# Patient Record
Sex: MALE | Race: WHITE | NOT HISPANIC OR LATINO | Employment: OTHER | URBAN - METROPOLITAN AREA
[De-identification: names, ages, dates, MRNs, and addresses within clinical notes are randomized per-mention and may not be internally consistent; named-entity substitution may affect disease eponyms.]

---

## 2021-05-26 ENCOUNTER — RECORDS - HEALTHEAST (OUTPATIENT)
Dept: ADMINISTRATIVE | Facility: CLINIC | Age: 51
End: 2021-05-26

## 2021-05-27 ENCOUNTER — RECORDS - HEALTHEAST (OUTPATIENT)
Dept: ADMINISTRATIVE | Facility: CLINIC | Age: 51
End: 2021-05-27

## 2021-05-30 ENCOUNTER — RECORDS - HEALTHEAST (OUTPATIENT)
Dept: ADMINISTRATIVE | Facility: CLINIC | Age: 51
End: 2021-05-30

## 2021-05-31 ENCOUNTER — RECORDS - HEALTHEAST (OUTPATIENT)
Dept: ADMINISTRATIVE | Facility: CLINIC | Age: 51
End: 2021-05-31

## 2021-06-01 ENCOUNTER — RECORDS - HEALTHEAST (OUTPATIENT)
Dept: ADMINISTRATIVE | Facility: CLINIC | Age: 51
End: 2021-06-01

## 2021-09-22 ENCOUNTER — MEDICAL CORRESPONDENCE (OUTPATIENT)
Dept: HEALTH INFORMATION MANAGEMENT | Facility: CLINIC | Age: 51
End: 2021-09-22
Payer: MEDICARE

## 2021-10-18 ENCOUNTER — TRANSCRIBE ORDERS (OUTPATIENT)
Dept: OTHER | Age: 51
End: 2021-10-18

## 2021-11-01 ENCOUNTER — TELEPHONE (OUTPATIENT)
Dept: EDUCATION SERVICES | Facility: CLINIC | Age: 51
End: 2021-11-01
Payer: MEDICARE

## 2021-11-01 NOTE — TELEPHONE ENCOUNTER
Please fax Diabetes Ed order to [facility] @ [phone], attn: Dr. Monteiro.   Dr Monteiro will need to fill our order form in order for patient to be seen w/[facility].

## 2021-11-01 NOTE — TELEPHONE ENCOUNTER
Referral received  10/27/2021 3:17pm inside DM Consult    [facility] - [phone]  Referring: Dr Monteiro  DX: DM type 2    Missing record DM Order and records.

## 2021-11-15 NOTE — TELEPHONE ENCOUNTER
Yes, if you can send a blank order out and note that order is needed for dm ed care for Humphrey, that would be wonderful! Thanks!

## 2021-11-15 NOTE — TELEPHONE ENCOUNTER
11/15 - pt calling back for update on this. Has form been sent? As we havent received anything from carlos on our end.

## 2021-11-17 NOTE — TELEPHONE ENCOUNTER
11/17 - called referring office x 2 - spoke with referring coordinator, states that provider declined to fill out and sign form as patient should be seeing one of their dm ed at their locations.

## 2021-11-17 NOTE — TELEPHONE ENCOUNTER
11/17 - called pt x 1 - pt notified that pcp declined to fill out and sign paperwork. And that pcp wants pt to see [facility]. He will call pcp office and go from there.

## 2024-05-31 ENCOUNTER — APPOINTMENT (OUTPATIENT)
Dept: CT IMAGING | Facility: HOSPITAL | Age: 54
End: 2024-05-31
Attending: STUDENT IN AN ORGANIZED HEALTH CARE EDUCATION/TRAINING PROGRAM
Payer: COMMERCIAL

## 2024-05-31 ENCOUNTER — HOSPITAL ENCOUNTER (EMERGENCY)
Facility: HOSPITAL | Age: 54
Discharge: HOME | End: 2024-05-31
Attending: EMERGENCY MEDICINE | Admitting: EMERGENCY MEDICINE
Payer: COMMERCIAL

## 2024-05-31 VITALS
SYSTOLIC BLOOD PRESSURE: 140 MMHG | OXYGEN SATURATION: 97 % | HEIGHT: 63 IN | TEMPERATURE: 97.5 F | RESPIRATION RATE: 16 BRPM | DIASTOLIC BLOOD PRESSURE: 85 MMHG | WEIGHT: 220 LBS | BODY MASS INDEX: 38.98 KG/M2 | HEART RATE: 82 BPM

## 2024-05-31 DIAGNOSIS — N20.1 URETEROLITHIASIS: ICD-10-CM

## 2024-05-31 LAB
ALBUMIN SERPL BCG-MCNC: 4.7 G/DL (ref 3.5–5.2)
ALBUMIN UR-MCNC: 30 MG/DL
ALP SERPL-CCNC: 51 U/L (ref 40–150)
ALT SERPL W P-5'-P-CCNC: 37 U/L (ref 0–70)
ANION GAP SERPL CALCULATED.3IONS-SCNC: 14 MMOL/L (ref 7–15)
APPEARANCE UR: CLEAR
AST SERPL W P-5'-P-CCNC: 30 U/L (ref 0–45)
BASOPHILS # BLD AUTO: 0.1 10E3/UL (ref 0–0.2)
BASOPHILS NFR BLD AUTO: 1 %
BILIRUB DIRECT SERPL-MCNC: <0.2 MG/DL (ref 0–0.3)
BILIRUB SERPL-MCNC: 0.6 MG/DL
BILIRUB UR QL STRIP: NEGATIVE
BUN SERPL-MCNC: 18.9 MG/DL (ref 6–20)
CALCIUM SERPL-MCNC: 9.9 MG/DL (ref 8.6–10)
CHLORIDE SERPL-SCNC: 99 MMOL/L (ref 98–107)
COLOR UR AUTO: ABNORMAL
CREAT SERPL-MCNC: 1.32 MG/DL (ref 0.67–1.17)
DEPRECATED HCO3 PLAS-SCNC: 22 MMOL/L (ref 22–29)
EGFRCR SERPLBLD CKD-EPI 2021: 65 ML/MIN/1.73M2
EOSINOPHIL # BLD AUTO: 0 10E3/UL (ref 0–0.7)
EOSINOPHIL NFR BLD AUTO: 0 %
ERYTHROCYTE [DISTWIDTH] IN BLOOD BY AUTOMATED COUNT: 12.8 % (ref 10–15)
GLUCOSE SERPL-MCNC: 227 MG/DL (ref 70–99)
GLUCOSE UR STRIP-MCNC: 100 MG/DL
HCT VFR BLD AUTO: 42 % (ref 40–53)
HGB BLD-MCNC: 14.2 G/DL (ref 13.3–17.7)
HGB UR QL STRIP: ABNORMAL
HOLD SPECIMEN: NORMAL
IMM GRANULOCYTES # BLD: 0.1 10E3/UL
IMM GRANULOCYTES NFR BLD: 1 %
KETONES UR STRIP-MCNC: NEGATIVE MG/DL
LEUKOCYTE ESTERASE UR QL STRIP: NEGATIVE
LIPASE SERPL-CCNC: 46 U/L (ref 13–60)
LYMPHOCYTES # BLD AUTO: 1.9 10E3/UL (ref 0.8–5.3)
LYMPHOCYTES NFR BLD AUTO: 11 %
MCH RBC QN AUTO: 31.1 PG (ref 26.5–33)
MCHC RBC AUTO-ENTMCNC: 33.8 G/DL (ref 31.5–36.5)
MCV RBC AUTO: 92 FL (ref 78–100)
MONOCYTES # BLD AUTO: 1.2 10E3/UL (ref 0–1.3)
MONOCYTES NFR BLD AUTO: 7 %
MUCOUS THREADS #/AREA URNS LPF: PRESENT /LPF
NEUTROPHILS # BLD AUTO: 13.4 10E3/UL (ref 1.6–8.3)
NEUTROPHILS NFR BLD AUTO: 80 %
NITRATE UR QL: NEGATIVE
NRBC # BLD AUTO: 0 10E3/UL
NRBC BLD AUTO-RTO: 0 /100
PH UR STRIP: 6.5 [PH] (ref 5–7)
PLATELET # BLD AUTO: 336 10E3/UL (ref 150–450)
POTASSIUM SERPL-SCNC: 4.7 MMOL/L (ref 3.4–5.3)
PROT SERPL-MCNC: 7.6 G/DL (ref 6.4–8.3)
RBC # BLD AUTO: 4.57 10E6/UL (ref 4.4–5.9)
RBC URINE: >182 /HPF
SODIUM SERPL-SCNC: 135 MMOL/L (ref 135–145)
SP GR UR STRIP: 1.02 (ref 1–1.03)
SQUAMOUS EPITHELIAL: <1 /HPF
UROBILINOGEN UR STRIP-MCNC: <2 MG/DL
WBC # BLD AUTO: 16.6 10E3/UL (ref 4–11)
WBC URINE: 2 /HPF

## 2024-05-31 PROCEDURE — 99285 EMERGENCY DEPT VISIT HI MDM: CPT | Mod: 25

## 2024-05-31 PROCEDURE — 85025 COMPLETE CBC W/AUTO DIFF WBC: CPT | Performed by: EMERGENCY MEDICINE

## 2024-05-31 PROCEDURE — 250N000011 HC RX IP 250 OP 636: Performed by: EMERGENCY MEDICINE

## 2024-05-31 PROCEDURE — 74177 CT ABD & PELVIS W/CONTRAST: CPT

## 2024-05-31 PROCEDURE — 250N000011 HC RX IP 250 OP 636: Performed by: STUDENT IN AN ORGANIZED HEALTH CARE EDUCATION/TRAINING PROGRAM

## 2024-05-31 PROCEDURE — 83690 ASSAY OF LIPASE: CPT | Performed by: EMERGENCY MEDICINE

## 2024-05-31 PROCEDURE — 36415 COLL VENOUS BLD VENIPUNCTURE: CPT | Performed by: STUDENT IN AN ORGANIZED HEALTH CARE EDUCATION/TRAINING PROGRAM

## 2024-05-31 PROCEDURE — 80053 COMPREHEN METABOLIC PANEL: CPT | Performed by: EMERGENCY MEDICINE

## 2024-05-31 PROCEDURE — 82248 BILIRUBIN DIRECT: CPT | Performed by: EMERGENCY MEDICINE

## 2024-05-31 PROCEDURE — 81001 URINALYSIS AUTO W/SCOPE: CPT | Performed by: EMERGENCY MEDICINE

## 2024-05-31 PROCEDURE — 96374 THER/PROPH/DIAG INJ IV PUSH: CPT

## 2024-05-31 RX ORDER — ONDANSETRON 4 MG/1
4 TABLET, ORALLY DISINTEGRATING ORAL EVERY 8 HOURS PRN
Qty: 10 TABLET | Refills: 0 | Status: SHIPPED | OUTPATIENT
Start: 2024-05-31

## 2024-05-31 RX ORDER — OXYCODONE HYDROCHLORIDE 5 MG/1
5 TABLET ORAL EVERY 4 HOURS PRN
Qty: 8 TABLET | Refills: 0 | Status: SHIPPED | OUTPATIENT
Start: 2024-05-31

## 2024-05-31 RX ORDER — BISACODYL 5 MG/1
5 TABLET, DELAYED RELEASE ORAL DAILY PRN
Qty: 20 TABLET | Refills: 0 | Status: SHIPPED | OUTPATIENT
Start: 2024-05-31

## 2024-05-31 RX ORDER — IOPAMIDOL 755 MG/ML
90 INJECTION, SOLUTION INTRAVASCULAR ONCE
Status: COMPLETED | OUTPATIENT
Start: 2024-05-31 | End: 2024-05-31

## 2024-05-31 RX ORDER — KETOROLAC TROMETHAMINE 15 MG/ML
15 INJECTION, SOLUTION INTRAMUSCULAR; INTRAVENOUS ONCE
Status: COMPLETED | OUTPATIENT
Start: 2024-05-31 | End: 2024-05-31

## 2024-05-31 RX ADMIN — KETOROLAC TROMETHAMINE 15 MG: 15 INJECTION, SOLUTION INTRAMUSCULAR; INTRAVENOUS at 18:36

## 2024-05-31 RX ADMIN — IOPAMIDOL 90 ML: 755 INJECTION, SOLUTION INTRAVENOUS at 19:03

## 2024-05-31 ASSESSMENT — COLUMBIA-SUICIDE SEVERITY RATING SCALE - C-SSRS
1. IN THE PAST MONTH, HAVE YOU WISHED YOU WERE DEAD OR WISHED YOU COULD GO TO SLEEP AND NOT WAKE UP?: NO
2. HAVE YOU ACTUALLY HAD ANY THOUGHTS OF KILLING YOURSELF IN THE PAST MONTH?: NO
6. HAVE YOU EVER DONE ANYTHING, STARTED TO DO ANYTHING, OR PREPARED TO DO ANYTHING TO END YOUR LIFE?: NO

## 2024-05-31 ASSESSMENT — ACTIVITIES OF DAILY LIVING (ADL)
ADLS_ACUITY_SCORE: 35
ADLS_ACUITY_SCORE: 33
ADLS_ACUITY_SCORE: 35

## 2024-05-31 NOTE — ED PROVIDER NOTES
Emergency Department Encounter   NAME: Sameer Fountain ; AGE: 53 year old male ; YOB: 1970 ; MRN: [id number] ; PCP: No primary care provider on file.   ED PROVIDER: Nancy Palacio PA-C    Evaluation Date & Time:   No admission date for patient encounter.    CHIEF COMPLAINT:  Flank Pain        Impression and Plan   FINAL IMPRESSION:    ICD-10-CM    1. Ureterolithiasis  N20.1 Adult Urology Referral          MDM:  Sameer is a 53 year old male with PMH of GERD, HTN, and T2DM presenting to the ED for evaluation of right-sided abdominal pain that radiates to the right side flank that started upon waking up this morning.  He took Tylenol and ibuprofen and states this did not really help reduce his pain. He does endorse mild nausea when the pain is at its worst with a few episodes of non-bloody vomiting. No fevers or chills. Does endorse some constipation, no diarrhea.  No blood in the stool or dark tarry stools.    Vitals reviewed and unremarkable aside from a BP of 158/83. On exam he is resting comfortably, not toxic-appearing or diaphoretic. Differential diagnosis includes but not limited to UTI, constipation, gallbladder pathology, pancreatitis, appendicitis, kidney stone, SBO. On exam he has reproducible right lower quadrant as well as right upper quadrant tenderness.  No right-sided CVA tenderness although he is endorsing right-sided flank pain. The rest of the abdomen is nontender.  The abdomen is overall soft and nondistended without any overlying skin changes.    CT abdomen pelvis finds an obstructing 3 mm stone in the right distal ureter with mild hydroureteronephrosis. There are additional bilateral nonobstructing renal calculi. No evidence of pyelonephritis and he does not have a fever or CVA tenderness on the right side. CT also finds a large volume of stool throughout the colon and is suggestive of constipation, no evidence of bowel obstruction or other acute finding in the abdomen or pelvis.  His urine shows evidence of blood which is also very consistent with a kidney stone.  No signs of UTI to indicate an infected stone.  He has a very mildly elevated creatinine of 1.32, I have no previous lab work to compare to.  GFR is within normal range and I have overall low suspicion for MAEGAN at this point.  CBC finds a leukocytosis of 16.6.  All other lab work unremarkable.    Given the stone is only 3 mm I suspect he will likely pass this in the next few days. He was given a strainer for home as well as some oxycodone for severe pain and Zofran for nausea.  A urology referral was placed.  I have also given him a prescription for Dulcolax to take daily given he has evidence of constipation on CT and I am prescribing him oxycodone. I recommended he take this daily until he starts having regular bowel movements, then he can just take it as needed.  He was educated on concerning symptoms that would warrant return to the ED and will follow-up with his primary care provider and urology.  Patient is understanding and agreeable to this plan.      History:  Supplemental history from: Documented in chart  External Record(s) reviewed: Documented in chart    Work Up:  Chart documentation includes differential considered and any EKGs or imaging independently interpreted by provider, where specified.  In additional to work up documented, I considered the following work up: Documented in chart, if applicable.    External consultation:  Discussion of management with another provider: Dr. Hoffmann    Complicating factors:  Care impacted by chronic illness: N/A  Care affected by social determinants of health: N/A    Disposition considerations: Discharge. I prescribed additional prescription strength medication(s) as charted. See documentation for any additional details.      ED COURSE:  6:00 PM I met and introduced myself to the patient. I gathered initial history and performed my physical exam. We discussed plan for initial  workup.   6:15 PM PM I have staffed the patient with Dr. Hoffmann, ED MD, who has evaluated the patient and agrees with all aspects of today's care.   8:03 PM I rechecked the patient and discussed results, discharge, follow up, and reasons to return to the ED.     At the conclusion of the encounter I discussed the results of all the tests and the disposition. The questions were answered. The patient or family acknowledged understanding and was agreeable with the care plan.        MEDICATIONS GIVEN IN THE EMERGENCY DEPARTMENT:  Medications   ketorolac (TORADOL) injection 15 mg (15 mg Intravenous $Given 5/31/24 1836)   iopamidol (ISOVUE-370) solution 90 mL (90 mLs Intravenous $Given 5/31/24 1905)         NEW PRESCRIPTIONS STARTED AT TODAY'S ED VISIT:  Discharge Medication List as of 5/31/2024  8:03 PM        START taking these medications    Details   bisacodyl (DULCOLAX) 5 MG EC tablet Take 1 tablet (5 mg) by mouth daily as needed for constipation, Disp-20 tablet, R-0, Local Print      ondansetron (ZOFRAN ODT) 4 MG ODT tab Take 1 tablet (4 mg) by mouth every 8 hours as needed for nausea, Disp-10 tablet, R-0, Local Print      oxyCODONE (ROXICODONE) 5 MG tablet Take 1 tablet (5 mg) by mouth every 4 hours as needed for severe pain If pain is not improved with acetaminophen and ibuprofen., Disp-8 tablet, R-0, Local Print               HPI   Patient information was obtained from: patient  Use of Intrepreter: N/A     Sameer is a 53 year old male with PMH of GERD, HTN, and T2DM presenting to the ED for evaluation of right-sided abdominal pain that radiates to the right side flank that started upon waking up this morning.  He took Tylenol and ibuprofen and states this did not really help reduce his pain. He does endorse mild nausea when the pain is at its worst with a few episodes of non-bloody vomiting. No fevers or chills. Does endorse some constipation, no diarrhea.  No blood in the stool or dark tarry  "stools.      Medical History     No past medical history on file.    No past surgical history on file.    No family history on file.         bisacodyl (DULCOLAX) 5 MG EC tablet  ondansetron (ZOFRAN ODT) 4 MG ODT tab  oxyCODONE (ROXICODONE) 5 MG tablet          Physical Exam     First Vitals:  Patient Vitals for the past 24 hrs:   BP Temp Temp src Pulse Resp SpO2 Height Weight   05/31/24 2007 (!) 140/85 -- -- 82 -- 97 % -- --   05/31/24 1845 129/72 -- -- 93 -- 96 % -- --   05/31/24 1806 (!) 151/90 -- -- 81 -- 97 % -- --   05/31/24 1720 (!) 158/83 97.5  F (36.4  C) Oral 81 16 98 % 1.6 m (5' 3\") 99.8 kg (220 lb)         PHYSICAL EXAM    General Appearance:  Alert, cooperative, no distress, appears stated age. Sitting comfortably, nontoxic-appearing.  HENT: Normocephalic without obvious deformity, atraumatic. Mucous membranes moist   Eyes: Conjunctiva clear, Lids normal. No discharge.   Respiratory: No distress. Lungs clear to ausculation bilaterally. No wheezes, rhonchi or stridor  Cardiovascular: Regular rate and rhythm, no murmur. Normal cap refill. No peripheral edema  GI: Abdomen soft, nondistended.  He has reproducible tenderness in the right upper and right lower quadrant.  : No CVA tenderness  Musculoskeletal: Moving all extremities. No gross deformities  Integument: Warm, dry, no rashes or lesions  Neurologic: Alert and orientated x3. No focal deficits.  Psych: Normal mood and affect        Results     LAB:  All pertinent labs reviewed and interpreted  Labs Ordered and Resulted from Time of ED Arrival to Time of ED Departure   COMPREHENSIVE METABOLIC PANEL - Abnormal       Result Value    Sodium 135      Potassium 4.7      Carbon Dioxide (CO2) 22      Anion Gap 14      Urea Nitrogen 18.9      Creatinine 1.32 (*)     GFR Estimate 65      Calcium 9.9      Chloride 99      Glucose 227 (*)     Alkaline Phosphatase 51      AST 30      ALT 37      Protein Total 7.6      Albumin 4.7      Bilirubin Total 0.6   "   ROUTINE UA WITH MICROSCOPIC REFLEX TO CULTURE - Abnormal    Color Urine Light Yellow      Appearance Urine Clear      Glucose Urine 100 (*)     Bilirubin Urine Negative      Ketones Urine Negative      Specific Gravity Urine 1.023      Blood Urine 1.0 mg/dL (*)     pH Urine 6.5      Protein Albumin Urine 30 (*)     Urobilinogen Urine <2.0      Nitrite Urine Negative      Leukocyte Esterase Urine Negative      Mucus Urine Present (*)     RBC Urine >182 (*)     WBC Urine 2      Squamous Epithelials Urine <1     CBC WITH PLATELETS AND DIFFERENTIAL - Abnormal    WBC Count 16.6 (*)     RBC Count 4.57      Hemoglobin 14.2      Hematocrit 42.0      MCV 92      MCH 31.1      MCHC 33.8      RDW 12.8      Platelet Count 336      % Neutrophils 80      % Lymphocytes 11      % Monocytes 7      % Eosinophils 0      % Basophils 1      % Immature Granulocytes 1      NRBCs per 100 WBC 0      Absolute Neutrophils 13.4 (*)     Absolute Lymphocytes 1.9      Absolute Monocytes 1.2      Absolute Eosinophils 0.0      Absolute Basophils 0.1      Absolute Immature Granulocytes 0.1      Absolute NRBCs 0.0     LIPASE - Normal    Lipase 46     BILIRUBIN DIRECT - Normal    Bilirubin Direct <0.20         RADIOLOGY:  CT Abdomen Pelvis w Contrast   Final Result   IMPRESSION:    1.  Obstructing 3 mm stone in the distal right ureter with mild proximal hydroureteronephrosis and delayed nephrogram.   2.  Additional bilateral nonobstructing renal calculi.   3.  Diffuse hepatic steatosis.   4.  Large volume formed stool throughout the colon, suggestive of constipation.            ECG:  N/A      PROCEDURES:  N/A      Nancy Palacio PA-C   Emergency Medicine   [facility] EMERGENCY DEPARTMENT       Nancy Palacio PA-C  05/31/24 204

## 2024-05-31 NOTE — ED PROVIDER NOTES
I am seeing this patient along with Nancy Palacio PA-C. I had a face to face encounter with this patient seen by the Advanced Practice Provider (JUDY).  I have seen, examined, and discussed the patient with the JUDY and agree with their assessment and plan of management. I personally saw the patient and performed a substantive portion of the visit including all aspects of the medical decision making.    HPI:  Sameer Fountain is a 53 y.o. male with pertinent history of hypertension and diabetes mellitus type II who presents with right-sided abdominal pain radiating to his right flank that he noticed upon waking up this morning and is associated with mild nausea. Tylenol and ibuprofen provided no relief. No vomiting.  ROS:    Physical Exam: Uncomfortable appearing      LABS  Pertinent lab results reviewed in chart.  Labs Ordered and Resulted from Time of ED Arrival to Time of ED Departure   COMPREHENSIVE METABOLIC PANEL - Abnormal       Result Value    Sodium 135      Potassium 4.7      Carbon Dioxide (CO2) 22      Anion Gap 14      Urea Nitrogen 18.9      Creatinine 1.32 (*)     GFR Estimate 65      Calcium 9.9      Chloride 99      Glucose 227 (*)     Alkaline Phosphatase 51      AST 30      ALT 37      Protein Total 7.6      Albumin 4.7      Bilirubin Total 0.6     ROUTINE UA WITH MICROSCOPIC REFLEX TO CULTURE - Abnormal    Color Urine Light Yellow      Appearance Urine Clear      Glucose Urine 100 (*)     Bilirubin Urine Negative      Ketones Urine Negative      Specific Gravity Urine 1.023      Blood Urine 1.0 mg/dL (*)     pH Urine 6.5      Protein Albumin Urine 30 (*)     Urobilinogen Urine <2.0      Nitrite Urine Negative      Leukocyte Esterase Urine Negative      Mucus Urine Present (*)     RBC Urine >182 (*)     WBC Urine 2      Squamous Epithelials Urine <1     CBC WITH PLATELETS AND DIFFERENTIAL - Abnormal    WBC Count 16.6 (*)     RBC Count 4.57      Hemoglobin 14.2      Hematocrit 42.0      MCV 92       MCH 31.1      MCHC 33.8      RDW 12.8      Platelet Count 336      % Neutrophils 80      % Lymphocytes 11      % Monocytes 7      % Eosinophils 0      % Basophils 1      % Immature Granulocytes 1      NRBCs per 100 WBC 0      Absolute Neutrophils 13.4 (*)     Absolute Lymphocytes 1.9      Absolute Monocytes 1.2      Absolute Eosinophils 0.0      Absolute Basophils 0.1      Absolute Immature Granulocytes 0.1      Absolute NRBCs 0.0     LIPASE - Normal    Lipase 46     BILIRUBIN DIRECT - Normal    Bilirubin Direct <0.20         EKG  N/A    RADIOLOGY  CT Abdomen Pelvis w Contrast   Final Result   IMPRESSION:    1.  Obstructing 3 mm stone in the distal right ureter with mild proximal hydroureteronephrosis and delayed nephrogram.   2.  Additional bilateral nonobstructing renal calculi.   3.  Diffuse hepatic steatosis.   4.  Large volume formed stool throughout the colon, suggestive of constipation.          PROCEDURES       ED COURSE & MEDICAL DECISION MAKING    6:18 PM The patient was staffed with me by Nancy Palacio PA-C.    Pertinent Labs and Imagaing reviewed (see chart for details)    53 year old male here for evaluation of flank pain and nausea that started suddenly.  Clinically, he is uncomfortable appearing and having difficulty sitting down.  We did consider musculoskeletal etiology versus something like AAA, ureterolithiasis, pyelonephritis.  Labs show hematuria with no signs of infection on his UA, no signs of renal dysfunction.  CT scan confirms an obstructing stone which explains his symptoms.  His symptoms are managed well enough that he is comfortable with outpatient treatment and we have given a [facility] referral as well as instructions for home care and prescriptions for management.    At the conclusion of the encounter I discussed  the results of all of the tests and the disposition.   The questions were answered.  The patient or family acknowledged understanding and was agreeable with the care plan.        FINAL IMPRESSION      1. Ureterolithiasis            CRITICAL CARE   Minutes    I, Liyah Parra am serving as a scribe to document services personally performed by Marycarmen Hoffmann MD, based on my observation and the provider's statements to me. I, Marycarmen Hoffmann MD, attest that Liyah Parra is acting in a scribe capacity, has observed my performance of the services and has documented them in accordance with my direction.     Marycarmen Hoffmann MD   5/31/2024 6:17 PM      Marycarmen Hoffmann MD  05/31/24 1959

## 2024-05-31 NOTE — ED TRIAGE NOTES
Pt on metformin reports rt sided constant abd pain radiating to Rt flank since waking today, Tylenol and Ibuprofen did not help reduce pain.Pt reports mild nausea r/t uncontrolled pain.     Triage Assessment (Adult)       Row Name 05/31/24 8048          Triage Assessment    Airway WDL WDL        Respiratory WDL    Respiratory WDL WDL        Skin Circulation/Temperature WDL    Skin Circulation/Temperature WDL WDL        Cardiac WDL    Cardiac WDL WDL        Peripheral/Neurovascular WDL    Peripheral Neurovascular WDL WDL        Cognitive/Neuro/Behavioral WDL    Cognitive/Neuro/Behavioral WDL WDL

## 2024-05-31 NOTE — ED TRIAGE NOTES
Triage Assessment (Adult)       Row Name 05/31/24 1722          Triage Assessment    Airway WDL WDL        Respiratory WDL    Respiratory WDL WDL        Skin Circulation/Temperature WDL    Skin Circulation/Temperature WDL WDL        Cardiac WDL    Cardiac WDL WDL        Peripheral/Neurovascular WDL    Peripheral Neurovascular WDL WDL        Cognitive/Neuro/Behavioral WDL    Cognitive/Neuro/Behavioral WDL WDL

## 2024-06-01 NOTE — DISCHARGE INSTRUCTIONS
You were seen in the emergency department today for right-sided abdominal and flank pain. Your CT scan shows that you have a kidney stone on your right side.  This is about 3 mm and should pass on its own.  You also are very constipated. I have prescribed you Dulcolax for this. I recommend taking 5mg daily until you start having regular bowel movements, then you can drop down to as needed to help with bowel movements.  I have also given you a limited supply of oxycodone to take as needed for severe pain.  You have been prescribed a narcotic pain medication that has risk for addiction with prolonged use, so please use sparingly.  Additionally, narcotics are medications that are sedating (will make you sleepy), so do not drive or operate machinery while taking this medication.  Avoid alcohol or other sedating medicines such as benzodiazepines while taking narcotics due to risk for increased sedation and difficulty breathing.      Narcotics will cause constipation.  If you need to take this medication please consider taking an over-the-counter stool softener and laxative, such as Senna Plus, to prevent constipation from developing.  Nausea is a side effect of narcotic use.  Possible additional side effects include vomiting, itching, and dizziness/lightheadedness.    You were also given some Zofran to take as needed for any nausea.    Please be sure that you are straining your urine to catch the kidney stone.    A referral to urology was placed for you, they will call you to schedule follow-up.  Please return to the emergency department if you develop any high fevers, severe worsened abdominal pain, or intractable nausea or vomiting.